# Patient Record
Sex: MALE | Race: WHITE | NOT HISPANIC OR LATINO | URBAN - METROPOLITAN AREA
[De-identification: names, ages, dates, MRNs, and addresses within clinical notes are randomized per-mention and may not be internally consistent; named-entity substitution may affect disease eponyms.]

---

## 2018-03-23 ENCOUNTER — OFFICE VISIT - HEALTHEAST (OUTPATIENT)
Dept: FAMILY MEDICINE | Facility: CLINIC | Age: 23
End: 2018-03-23

## 2018-03-23 ENCOUNTER — COMMUNICATION - HEALTHEAST (OUTPATIENT)
Dept: TELEHEALTH | Facility: CLINIC | Age: 23
End: 2018-03-23

## 2018-03-23 DIAGNOSIS — K64.0 GRADE I HEMORRHOIDS: ICD-10-CM

## 2018-03-23 DIAGNOSIS — N50.811 TESTICULAR PAIN, RIGHT: ICD-10-CM

## 2018-03-23 ASSESSMENT — MIFFLIN-ST. JEOR: SCORE: 1900.71

## 2018-03-26 ENCOUNTER — HOSPITAL ENCOUNTER (OUTPATIENT)
Dept: ULTRASOUND IMAGING | Facility: CLINIC | Age: 23
Discharge: HOME | End: 2018-03-26
Attending: FAMILY MEDICINE

## 2018-03-26 DIAGNOSIS — N50.811 TESTICULAR PAIN, RIGHT: ICD-10-CM

## 2018-03-27 ENCOUNTER — COMMUNICATION - HEALTHEAST (OUTPATIENT)
Dept: FAMILY MEDICINE | Facility: CLINIC | Age: 23
End: 2018-03-27

## 2018-03-29 ENCOUNTER — COMMUNICATION - HEALTHEAST (OUTPATIENT)
Dept: FAMILY MEDICINE | Facility: CLINIC | Age: 23
End: 2018-03-29

## 2018-08-31 ENCOUNTER — COMMUNICATION - HEALTHEAST (OUTPATIENT)
Dept: TELEHEALTH | Facility: CLINIC | Age: 23
End: 2018-08-31

## 2018-08-31 ENCOUNTER — OFFICE VISIT - HEALTHEAST (OUTPATIENT)
Dept: FAMILY MEDICINE | Facility: CLINIC | Age: 23
End: 2018-08-31

## 2018-08-31 DIAGNOSIS — K29.00 ACUTE GASTRITIS WITHOUT HEMORRHAGE, UNSPECIFIED GASTRITIS TYPE: ICD-10-CM

## 2018-08-31 DIAGNOSIS — K64.4 EXTERNAL HEMORRHOIDS: ICD-10-CM

## 2018-09-18 ENCOUNTER — AMBULATORY - HEALTHEAST (OUTPATIENT)
Dept: LAB | Facility: CLINIC | Age: 23
End: 2018-09-18

## 2018-09-18 DIAGNOSIS — K29.00 ACUTE GASTRITIS WITHOUT HEMORRHAGE, UNSPECIFIED GASTRITIS TYPE: ICD-10-CM

## 2018-09-20 LAB
H PYLORI AG STL QL IA: NORMAL
REPORT STATUS: NORMAL
SPECIMEN DESCRIPTION: NORMAL

## 2020-12-04 ENCOUNTER — COMMUNICATION - HEALTHEAST (OUTPATIENT)
Dept: TELEHEALTH | Facility: CLINIC | Age: 25
End: 2020-12-04

## 2020-12-04 ENCOUNTER — OFFICE VISIT - HEALTHEAST (OUTPATIENT)
Dept: FAMILY MEDICINE | Facility: CLINIC | Age: 25
End: 2020-12-04

## 2020-12-04 DIAGNOSIS — Z13.228 SCREENING FOR METABOLIC DISORDER: ICD-10-CM

## 2020-12-04 DIAGNOSIS — Z00.00 ROUTINE GENERAL MEDICAL EXAMINATION AT A HEALTH CARE FACILITY: ICD-10-CM

## 2020-12-04 LAB
CHOLEST SERPL-MCNC: 145 MG/DL
FASTING STATUS PATIENT QL REPORTED: YES
HBA1C MFR BLD: 5 %
HDLC SERPL-MCNC: 63 MG/DL
LDLC SERPL CALC-MCNC: 74 MG/DL
TRIGL SERPL-MCNC: 42 MG/DL
TSH SERPL DL<=0.005 MIU/L-ACNC: 1.52 UIU/ML (ref 0.3–5)

## 2020-12-04 ASSESSMENT — MIFFLIN-ST. JEOR: SCORE: 1839.69

## 2020-12-08 ENCOUNTER — COMMUNICATION - HEALTHEAST (OUTPATIENT)
Dept: FAMILY MEDICINE | Facility: CLINIC | Age: 25
End: 2020-12-08

## 2021-06-01 VITALS — WEIGHT: 186 LBS | BODY MASS INDEX: 24.54 KG/M2

## 2021-06-01 VITALS — WEIGHT: 190 LBS | BODY MASS INDEX: 25.18 KG/M2 | HEIGHT: 73 IN

## 2021-06-05 VITALS
HEART RATE: 60 BPM | HEIGHT: 74 IN | TEMPERATURE: 97.8 F | BODY MASS INDEX: 22.58 KG/M2 | SYSTOLIC BLOOD PRESSURE: 110 MMHG | WEIGHT: 175.9 LBS | DIASTOLIC BLOOD PRESSURE: 64 MMHG

## 2021-06-20 NOTE — PROGRESS NOTES
Assessment/plan   Clyde Montes is a 23 y.o. male who isestablish patient to my practice here with   Chief Complaint   Patient presents with     Abdominal Pain     LLQ also right behind belly button, on and off x1 month        Clyde was seen today for abdominal pain.    Diagnoses and all orders for this visit:    Acute gastritis without hemorrhage, unspecified gastritis type  Comments:  last out of country travel to [place] last yr   Orders:  -     H. pylori Antigen, Stool(HPSAG); Future  -     omeprazole (PRILOSEC) 20 MG capsule; Take 1 capsule (20 mg total) by mouth 2 (two) times a day before meals.    External hemorrhoids    Other orders  -     psyllium (METAMUCIL SUGAR FREE) Powd; Take 6 g by mouth daily.  patient adv in taking fiber supplement every night to prevent hemorrhoid   Also checking H pylori to r/o infection after sampling done patient can start taking prilosec twice daily    And keeping food  Diary to see if any perticular food making things worse   Pt denies any recent travel or stress     Subjective:      HPI: Clyde Montes is a 23 y.o. male is here for.LUQ and LLQ  abd pain    Abdominal Pain: Patient complains of abdominal pain. The pain is described as aching, colicky, cramping and pressure-like, and is 5/10 in intensity. Pain is located in the LUQ, LLQ without radiation. Onset was 1 month ago. Symptoms have been gradually worsening since. Aggravating factors: eating.  Alleviating factors: bowel movements and also recent flare up of hemorrhoids with bleeding again . Associated symptoms: none. The patient denies anorexia, arthralgias, belching, constipation and diarrhea.              No past medical history on file.  No past surgical history on file.  Review of patient's allergies indicates no known allergies.  Current Outpatient Prescriptions   Medication Sig Dispense Refill     hydrocortisone (ANUSOL-HC) 2.5 % rectal cream Apply rectally 2 times daily 30 g 1     hydrocortisone 25 mg suppository  Insert 1 suppository (25 mg total) into the rectum every 12 (twelve) hours. 24 suppository 1     omeprazole (PRILOSEC) 20 MG capsule Take 1 capsule (20 mg total) by mouth 2 (two) times a day before meals. 60 capsule 1     psyllium (METAMUCIL SUGAR FREE) Powd Take 6 g by mouth daily. 283 each 0     No current facility-administered medications for this visit.      Family History   Problem Relation Age of Onset     No Medical Problems Mother      No Medical Problems Father        Patient Active Problem List   Diagnosis   (none) - all problems resolved or deleted       Review of Systems   12 point comprehensive review of systems was negative except as noted and HPI     Social History     Social History Narrative    Graduated as  from [employer]     Working for [employer] at [employer]    Graduated from [employer]        Adelina Carrasco MD 3/23/2018 9:07 AM         Objective:     Vitals:    08/31/18 1242   BP: 138/84   Pulse: 74   Weight: 186 lb (84.4 kg)       Physical Exam:     General: Alert, no acute distress.   HEENT: normocephalic conjunctivae are clear, Normal pearly TMs bilaterally without erythema, pus or fluid. Position and landmarks are normal.  Nose is clear.  Oropharynx is moist and clear, without tonsillar hypertrophy, asymmetry, exudate or lesions.  Neck: supple without adenopathy or thyromegaly.  Lungs: Good aeration bilaterally. No prolongation of expiratory phase.   No tachypnea, retractions, or increased work of breathing. Clear to auscultation without wheezes, rales or rhonci.    Heart: regular rate and rhythm, normal S1 and S2, no murmurs  Abdomen: soft and nontender, bowel sounds are present, no hepatosplenomegaly or mass palpable.  Skin: clear without rash or lesions  Neuro: alert, interactive moving all extremities equally, normal muscle tone in all 4 extremities, deep tendon reflexes 2+ symmetrically at the patella      Adelina Carrasco MD    There are no Patient  Instructions on file for this visit.

## 2021-06-21 NOTE — LETTER
Letter by Adelina Carrasco MD at      Author: Adelina Carrasco MD Service: -- Author Type: --    Filed:  Encounter Date: 12/8/2020 Status: (Other)         Clyde Montes  [address] [facility] [address]   December 8, 2020         Dear Mr. Montes,    Below are the results from your recent visit:    Resulted Orders   Lipid Cascade   Result Value Ref Range    Cholesterol 145 <=199 mg/dL    Triglycerides 42 <=149 mg/dL    HDL Cholesterol 63 >=40 mg/dL    LDL Calculated 74 <=129 mg/dL    Patient Fasting > 8hrs? Yes    Glycosylated Hemoglobin A1c   Result Value Ref Range    Hemoglobin A1c 5.0 <=5.6 %      Comment:      Prediabetes:   HBA1c       5.7 to 6.4%        Diabetes:        HBA1c        >=6.5%   Patients with Hgb F >5%, total bilirubin >10.0 mg/dL, abnormal red cell turnover, severe renal or hepatic disease or malignancy should not have this A1C method used to diagnose or monitor diabetes.       Thyroid Stimulating Hormone (TSH)   Result Value Ref Range    TSH 1.52 0.30 - 5.00 uIU/mL        Labs are in good range no concern for high cholesterol or diabetes or thyroid hormone issues.     Please call with questions or contact us using [employer].    Sincerely,        Electronically signed by Adelina Carrasco MD

## 2021-06-26 NOTE — PROGRESS NOTES
Progress Notes by Adelina Carrasco MD at 3/23/2018  7:45 AM     Author: Adelina Carrasco MD Service: -- Author Type: Physician    Filed: 3/23/2018  9:07 AM Encounter Date: 3/23/2018 Status: Signed    : Adelina Carrasco MD (Physician)       Assessment/plan   Clyde Montes is a 22 y.o. male who is New  patient to my practice here with   Chief Complaint   Patient presents with   ? Hemorrhoids     since 10/2017, has tried at home OTC meds not helping    ? Testicle Pain     right, on and off x3-4 yrs, larger in side    ? Establish Care     HP pt        Clyde was seen today for hemorrhoids, testicle pain and establish care.    Diagnoses and all orders for this visit:    Testicular pain, right  -     US Scrotum and Testicles W Duplex Ltd; Future    Grade I hemorrhoids  -     hydrocortisone 25 mg suppository; Insert 1 suppository (25 mg total) into the rectum every 12 (twelve) hours.      history of ADHD but not taking any med for last 1 yr doing well   Subjective:      HPI: Clyde Montes is a 22 y.o. male is here for.    Hemorrhoids: Patient complains of evaluation of possible hemorrhoids main symptoms are rectal bleeding and itching . Onset of symptoms was gradual starting 1 year ago ago with gradually worsening course since that time.  He describes symptoms as anorectal itching, bleeding which only occurs with bowel movements and pain with sitting. Treatment to date has been OTC creams: not very effective. Patient denies history of previous STDs, known or suspected STD exposure, melena and receptive anal intercourse.    Epidermal /scrotal pain: Patient complains of a pain  Located under the right scrotum and testicular area  Pain more dull then acute didn't feel any swelling with it .  This has been present for 4 year.  There has been no associated symptoms of discharge, tenderness, sudden swelling, or pain today but when happen rated at 5/10 last for 2-3 days then subside , work out mainly wt lifting heaviest wt 230 ib    "Patient does not have a history of epidermal inclusion cysts.      .          No past medical history on file.  No past surgical history on file.  Review of patient's allergies indicates no known allergies.  Current Outpatient Prescriptions   Medication Sig Dispense Refill   ? hydrocortisone 25 mg suppository Insert 1 suppository (25 mg total) into the rectum every 12 (twelve) hours. 24 suppository 1     No current facility-administered medications for this visit.      Family History   Problem Relation Age of Onset   ? No Medical Problems Mother    ? No Medical Problems Father        Patient Active Problem List   Diagnosis   ? ADHD (attention deficit hyperactivity disorder)       Review of Systems   12 point comprehensive review of systems was negative except as noted and HPI     Social History     Social History Narrative    Graduated as  from [employer]     Working for [employer] at [employer]    Graduated from [employer]        Adelina Carrasco MD 3/23/2018 9:07 AM         Objective:     Vitals:    03/23/18 0755   BP: 134/84   Pulse: 60   Weight: 190 lb (86.2 kg)   Height: 6' 1\" (1.854 m)       Physical Exam:     General: Alert, no acute distress.   HEENT: normocephalic conjunctivae are clear, Normal pearly TMs bilaterally without erythema, pus or fluid. Position and landmarks are normal.  Nose is clear.  Oropharynx is moist and clear, without tonsillar hypertrophy, asymmetry, exudate or lesions.  Neck: supple without adenopathy or thyromegaly.  Lungs: Good aeration bilaterally. No prolongation of expiratory phase.   No tachypnea, retractions, or increased work of breathing. Clear to auscultation without wheezes, rales or rhonci.    Heart: regular rate and rhythm, normal S1 and S2, no murmurs  Abdomen: soft and nontender, bowel sounds are present, no hepatosplenomegaly or mass palpable.  .  Skin: clear without rash or lesions  Neuro: alert, interactive moving all extremities equally, " normal muscle tone in all 4 extremities, deep tendon reflexes 2+ symmetrically at the patella      Adelina Carrasco MD    Patient Instructions       Dear Clyde    It was a pleasure to see you in clinic today. Should you have any questions or concerns, my assistant is Mary / and care coordinator Ayanna and they  can be reached directly at [phone]    Plan discussed at this visit : our goal is to prevent hemorrhoids so more fiber and keep a food diary to see if any food flare the hemorrhoid   We also orderd the ultrasound of the scrotum/ testical to figure out what causing the pain   If ultrasound normal will refer you to pelvic  for further care       Feel free to call for any concerns or questions or send us My chart message     Adelina Carrasco MD         Treating Hemorrhoids: Self-Care    Follow your healthcare providers advice about caring for your hemorrhoids at home. Some treatments help relieve symptoms right away. Others involve making changes in your diet and exercise habits. These can help ease constipation and prevent hemorrhoid symptoms from coming back.  Relieving symptoms  Your healthcare provider may prescribe anti-inflammatory medicine to help ease your symptoms. The following tips will also help relieve pain and swelling.    Take sitz baths. Taking a sitz bath means sitting in a few inches of warm bath water. Soaking for 10 minutes twice a day can provide welcome relief from painful hemorrhoids. It can also help the area stay clean.    Develop good bowel habits. Use the bathroom when you need to. Dont ignore the urge to move your bowels. This can lead to constipation, hard stools, and straining. Also, dont read while on the toilet. Sit only as long as needed. Wipe gently with soft, unscented toilet tissue or baby wipes.    Use ice packs. Placing an ice pack on a thrombosed external hemorrhoid can help relieve pain right away. It will also help reduce the blood clot. Use the ice for 15  to 20 minutes at a time. Keep a cloth between the ice and your skin to prevent skin damage.    Use other measures. Laxatives and enemas can help ease constipation. But use them only on your healthcare providers advice. For symptom relief, try using cotton pads soaked in witch hazel. These are available at most drugstores. Over-the-counter hemorrhoid ointments and petroleum jelly can also provide relief.  Add fiber to your diet  Adding fiber to your diet can help relieve constipation by making stools softer and easier to pass. To increase your fiber intake, your healthcare provider may recommend a bulking agent, such as psyllium. This is a high-fiber supplement available at most grocery stores and drugstores. Eating more fiber-rich foods will also help. There are two types of fiber:    Insoluble fiber is the main ingredient in bulking agents. Its also found in foods such as wheat bran, whole-grain breads, fresh fruits, and vegetables.    Soluble fiber is found in foods such as oat bran. Although soluble fiber is good for you, it may not ease constipation as much as foods high in insoluble fiber.  Drink more water  Along with a high-fiber diet, drinking more water can help ease constipation. This is because insoluble fiber absorbs water, making stools soft and bulky. Be sure to drink plenty of water throughout the day. Drinking fruit juices, such as prune juice or apple juice, can also help prevent constipation.  Get more exercise  Regular exercise aids digestion and helps prevent constipation. Its also great for your health. So talk with your healthcare provider about starting an exercise program. Low-impact activities, such as swimming or walking, are good places to start. Take it easy at first. And remember to drink plenty of water when you exercise.  High-fiber foods  High-fiber foods offer many benefits. By making your stools softer, they help heal and prevent swollen hemorrhoids. They may also help reduce the  risk of colon and rectal cancer. Best of all, theyre usually low in calories and taste great. Here are some examples of fiber-rich foods.    Whole grains, such as wheat bran, corn bran, and brown rice.    Vegetables, especially carrots, broccoli, cabbage, and peas.    Fruits, such as apples, bananas, raisins, peaches, and pears.    Nuts and legumes, especially peanuts, lentils, and kidney beans.  Easy ways to add fiber  The tips below offer some simple ways to add more high-fiber foods to your meals.    Start your day with a high-fiber breakfast. Eat a wheat bran cereal along with a sliced banana. Or, try peanut butter on whole-wheat toast.    Eat carrot sticks for snacks. Theyre easy to prepare, taste great, and are low in calories.    Use whole-grain breads instead of white bread for sandwiches.    Eat fruits for treats. Try an apple and some raisins instead of a candy bar.   Date Last Reviewed: 7/1/2016 2000-2016 [employer]. [address] [place] [address]. All rights reserved. This information is not intended as a substitute for professional medical care. Always follow your healthcare professional's instructions.        Understanding Hemorrhoids    Hemorrhoid tissues are cushions of blood vessels that swell slightly during bowel movements. Too much pressure on the anal canal can make these tissues remain enlarged, become inflamed, and cause symptoms. This can happen both inside and outside the anal canal.  Parts of the anal canal  The parts of the anal canal are:    Internal hemorrhoid tissue is in the upper area of the anal canal.    The rectum is the last several inches of the colon. This is where stool is stored prior to bowel movements.    Anal sphincters are ring-shaped muscles that expand and contract to control the anal opening.    External hemorrhoid tissue lies under the anal skin.    The anus is the passage between the rectum and the outside of the body.  Normal hemorrhoid  tissue  Hemorrhoid tissues play an important role in helping your body eliminate waste. Food passes from the stomach through the intestines. The waste (stool) then travels through the colon to the rectum. It is stored in the rectum until its ready to be passed from the anus. During bowel movements, hemorrhoids swell with blood and become slightly larger. This swelling helps protect and cushion the anal canal as stool passes from the body. Once the stool has passed, the tissues stop swelling and return to normal.  Problem hemorrhoids  Pressure due to straining or other factors can cause hemorrhoid tissues to remain swollen. When this happens to the hemorrhoid tissues in the anal canal theyre called internal hemorrhoids. Swollen tissues around the anal opening are called external hemorrhoids. Depending on the location, your symptoms can differ.    Internal hemorrhoids often happen in clusters around the wall of the anal canal. They are usually painless. But they may prolapse (protrude out of the anus) due to straining or pressure from hard stool. After the bowel movement is over, they may then reduce (return inside the body). Internal hemorrhoids often bleed. They can also discharge mucus.      External hemorrhoids are located at the anal opening, just beneath the skin. These tissues rarely cause problems unless they thrombose (form a blood clot). When this happens, a hard, bluish lump may appear. A thrombosed hemorrhoid also causes sudden, severe pain. In time, the clot may go away on its own. This sometimes leaves a skin tag of tissue stretched by the clot.  Hemorrhoid symptoms  Hemorrhoid symptoms may include:    Pain or a burning sensation    Bleeding during bowel movements    Protrusion of tissue from the anus    Itching around the anus  Causes of hemorrhoids  Theres no single cause of hemorrhoids. Most often, though, they are caused by too much pressure on the anal canal. This can be due to:    Chronic (ongoing)  constipation    Straining during bowel movements    Sitting too long on the toilet    Strenuous exercise or heavy lifting    Pregnancy and childbirth    Aging    Diarrhea  Date Last Reviewed: 7/1/2016 2000-2016 [employer]. [address] [place] [address], [place] [address]. All rights reserved. This information is not intended as a substitute for professional medical care. Always follow your healthcare professional's instructions.        Treating Epididymitis and Orchitis    You have inflammation of the epididymis (epididymitis) and testicle (orchitis). This is likely due to an infection. In many cases, epididymitis and orchitis occur along with urinary tract infections. Treatment includes medication to get rid of the infection. It also includes medication and other methods to relieve symptoms.  Possible treatments    Antibiotics. Acute epididymitis is most often treated with oral antibiotics. You may also be given an injection of antibiotics. Be sure to take all of your medication until it is gone, even if you feel better.    Anti-inflammatories. You may be prescribed medication to reduce swelling and tenderness.    Rest. You will most likely need to rest for 3 to 4 days until swelling and fever are gone. When you are able, lie down with a towel folded under the scrotum to raise it slightly. This can help relieve discomfort.    Scrotal support. If your testicles are swollen, wear an athletic supporter (jockstrap) or spandex shorts. This may help control swelling and ease symptoms.    Ice and heat. To relieve swelling, use an ice pack wrapped in a thin towel on the scrotum. Once swelling is gone, sit in a warm bath to increase blood flow to the area.  After treatment  The inflammation will go away with treatment. But you may have an achy feeling in the testicles for 2 to 4 weeks. This does not mean the infection has come back. The testicles just take time to heal. But if you feel a lump in a testicle after  treatment, see your doctor. Once the inflammation is gone, you can be active again.  If you are sexually active, your partner(s) need to see a health care provider as well. This is because sex can sometimes spread the infection that causes this condition.   Date Last Reviewed: 9/19/2014 2000-2016 [employer]. [address] [place] [address], [place] [address]. All rights reserved. This information is not intended as a substitute for professional medical care. Always follow your healthcare professional's instructions.

## 2021-06-30 NOTE — PROGRESS NOTES
Progress Notes by Adelina Carrasco MD at 12/4/2020  9:20 AM     Author: Adelina Carrasco MD Service: -- Author Type: Physician    Filed: 12/4/2020  1:37 PM Encounter Date: 12/4/2020 Status: Signed    : Adelina Carrasco MD (Physician)       MALE PREVENTATIVE EXAM    Assessment and Plan:     Patient has been advised of split billing requirements and indicates understanding: Yes    Clyde was seen today for annual exam.    Routine general medical examination at a health care facility    Screening for metabolic disorder  -     Lipid Cascade  -     Glycosylated Hemoglobin A1c  -     Thyroid Stimulating Hormone (TSH)    Other orders  -     HPV vaccine 9 valent 3 dose IM        Next follow up:  No follow-ups on file.    Immunization Review  Adult Imm Review: Due today, orders placed    I discussed the following with the patient:   Adult Healthy Living: Importance of regular exercise  Healthy nutrition  Stress management  Supplement use    I have had an Advance Directives discussion with the patient.    Subjective:   Chief Complaint: Clyde Montes is an 25 y.o. male here for a preventative health visit.      HPI: Healthy 25-year-old male who works as a  for [employer], plan to move to [place] for his job but excited about it denies any new concerns or question today    Healthy Habits  Are you taking a daily aspirin? No  Do you typically exercising at least 40 min, 3-4 times per week?  Yes  Do you usually eat at least 4 servings of fruit and vegetables a day, include whole grains and fiber and avoid regularly eating high fat foods? Yes  Have you had an eye exam in the past two years? Yes  Do you see a dentist twice per year? NO  Do you have any concerns regarding sleep? No    Safety Screen  If you own firearms, are they secured in a locked gun cabinet or with trigger locks? Yes  No data recorded    Review of Systems:  Please see above.  The rest of the review of systems are negative for all systems.     Cancer  "Screening     Patient has no health maintenance due at this time          Patient Care Team:  Adelina Carrasco MD as PCP - General (Family Medicine)  Adelina Carrasco MD as Assigned PCP        History     Reviewed By Date/Time Sections Reviewed    Darleen Alcaraz CMA 12/4/2020  9:23 AM Tobacco            Objective:   Vital Signs:   Visit Vitals  /64 (Patient Site: Left Arm, Patient Position: Sitting, Cuff Size: Adult Large)   Pulse 60   Temp 97.8  F (36.6  C) (Oral)   Ht 6' 1.5\" (1.867 m)   Wt 175 lb 14.4 oz (79.8 kg)   BMI 22.89 kg/m           PHYSICAL EXAM    PHYSICAL EXAM  General: Alert, no acute distress.   EYES normocephalic conjunctivae are hilton,   EAR Normal pearly TMs bilaterally without erythema, pus or fluid  Nose is clear.  Oropharynx is moist and clear,   Neck: supple without adenopathy or thyromegaly.  Lungs: Good aeration bilaterally.Clear to auscultation without wheezes, rales or rhonci.    Heart: regular rate and rhythm, normal S1 and S2, no murmurs  Abdomen: soft and nontender, bowel sounds are present, no hepatosplenomegaly or mass palpable.  Skin: clear without rash or lesions  Neuro: normal muscle tone in all 4 extremities, deep tendon reflexes 2+ symmetrically at the patella             Medication List          Accurate as of December 4, 2020  1:37 PM. If you have any questions, ask your nurse or doctor.            CONTINUE taking these medications    omeprazole 20 MG capsule  Also known as: PriLOSEC  INSTRUCTIONS: Take 1 capsule (20 mg total) by mouth 2 (two) times a day before meals.        psyllium Powd  Also known as: Metamucil Sugar Free  INSTRUCTIONS: Take 6 g by mouth daily.           STOP taking these medications    hydrocortisone 2.5 % rectal cream  Also known as: Anusol-HC  Stopped by: Adelina Carrasco MD     hydrocortisone 25 mg suppository  Stopped by: Adelina Carrasco MD            Additional Screenings Completed Today:          "